# Patient Record
(demographics unavailable — no encounter records)

---

## 2024-12-03 NOTE — ASSESSMENT
[FreeTextEntry1] : Patient is 7 months s/p Lingula sparing left upper lobectomy for Stage IA3, pT1cN0, invasive acinar adenocarcinoma.   I reviewed the Surveillance CT chest completed on 11/22/2024 with the patient today: stable post sublobar resection left upper lobe. No consolidation, mass or pleural effusions. Stable approximate 6 mm solid nodule left lower lobe (5/122). No suspicious pulmonary parenchymal lesions. She is doing well with mild phlegm in the morning. No persist cough or shortness of breath.    Will continue lung cancer Surveillance with a CT chest in 6 months.  Patient was advised to contact us should any concerning symptoms arise.   Plan: CT chest in 6 months

## 2024-12-03 NOTE — HISTORY OF PRESENT ILLNESS
[FreeTextEntry1] : ROME MUNROE is a 55-year-old F, never smoker, with no significant PMHX, who is referred by Dr. Tristan Thomas for an initial evaluation of lung nodule.  On 5/3/24, patient underwent left VATS, robotic-assisted, Wedge resection of left upper lobe lung nodule, Lingula-sparing left upper lobectomy, Mediastinal lymph node dissection. Surgical pathology: Stage IA3, pT1cN0, invasive acinar adenocarcinoma, all margins negative. No adjuvant chemotherapy indicated per NCCN guideline.  She presents today to review the first Surveillance scan.  Reports feeling well in general. No unintentional weight loss, headache, anorexia, fatigue, persist cough, chest pain, dyspnea, or hemoptysis.  CT chest on 11/22/2024 MPRESSION: Since  January 14, April 30, 2024. Post sublobar resection left upper lobe. No consolidation, mass or pleural effusions. Stable approximate 6 mm solid nodule left lower lobe (5/122). No suspicious pulmonary parenchymal lesions Approximate 9 mm hypodense lesion, posterior right hepatic lobe, similar to earlier study. New approximate 4 mm hypodensity right hepatic dome (3/118) , too small to fully characterize.

## 2024-12-03 NOTE — DATA REVIEWED
[FreeTextEntry1] : PET/CT on 04/30/2024 1. Mildly FDG avid 3.2 x 2.2 cm groundglass mass at the anterior left upper lobe, SUV max 1.5, probably a low-grade malignancy. No evidence of FDG avid metastatic disease. 2. Probable small right renal cyst. Confirmation with renal ultrasound is recommended.

## 2024-12-03 NOTE — REASON FOR VISIT
[Home] : at home, [unfilled] , at the time of the visit. [Medical Office: ([facility])___] : at the medical office located in  [Verbal consent obtained from patient] : the patient, [unfilled] [Follow-Up: _____] : a [unfilled] follow-up visit

## 2024-12-04 NOTE — ASSESSMENT
[FreeTextEntry1] : Patient is 7 months s/p Lingula sparing left upper lobectomy for Stage IA3, pT1cN0, invasive acinar adenocarcinoma.   I reviewed the Surveillance CT chest completed on 11/22/2024 with the patient today: stable post sublobar resection left upper lobe. No consolidation, mass or pleural effusions. Stable approximate 6 mm solid nodule left lower lobe (5/122). No suspicious pulmonary parenchymal lesions. She is doing well with mild phlegm in the morning. No persist cough or shortness of breath.    Will continue lung cancer Surveillance with a CT chest in 6 months.  Patient was advised to contact us should any concerning symptoms arise.   Plan: CT chest in 6 months Rhombic Flap Text: The defect edges were debeveled with a #15 scalpel blade.  Given the location of the defect and the proximity to free margins a rhombic flap was deemed most appropriate.  Using a sterile surgical marker, an appropriate rhombic flap was drawn incorporating the defect.    The area thus outlined was incised deep to adipose tissue with a #15 scalpel blade.  The skin margins were undermined to an appropriate distance in all directions utilizing iris scissors.

## 2025-05-23 NOTE — REASON FOR VISIT
[Home] : at home, [unfilled] , at the time of the visit. [Medical Office: ([facility])___] : at the medical office located in  [Telephone (audio)] : This telephonic visit was provided via audio only technology. [No access to tele-video equipment] : patient lacks access to tele-video equipment. [Verbal consent obtained from patient] : the patient, [unfilled] [Follow-Up: _____] : a [unfilled] follow-up visit

## 2025-05-23 NOTE — HISTORY OF PRESENT ILLNESS
[FreeTextEntry1] : ROME MUNROE is a 55-year-old F, never smoker, with no significant PMHX, who is referred by Dr. Tristan Thomas for an initial evaluation of lung nodule.  On 5/3/24, patient underwent left VATS, robotic-assisted, Wedge resection of left upper lobe lung nodule, Lingula-sparing left upper lobectomy, Mediastinal lymph node dissection. Surgical pathology: Stage IA3, pT1cN0, invasive acinar adenocarcinoma, all margins negative. No adjuvant chemotherapy indicated per NCCN guideline. She presents today to review the first Surveillance scan. Reports feeling well in general. No unintentional weight loss, headache, anorexia, fatigue, persist cough, chest pain, dyspnea, or hemoptysis.  CT chest on 05/17/2025 - Since 2023, unchanged few benign-appearing subcentimeter solid nodules, largest in left lower lobe (5, 125), 6 mm. Left lower lobe pleuroparenchymal scaring. - Since November 22, 2024, no cancer recurrence and no change.

## 2025-05-23 NOTE — ASSESSMENT
[FreeTextEntry1] : Patient is s/p Lingula sparing left upper lobectomy for Stage IA3, pT1cN0, invasive acinar adenocarcinoma.  I reviewed the Surveillance CT chest completed on 05/17/2025 with the patient today: stable post sublobar resection left upper lobe. No consolidation, mass or pleural effusions. Stable approximate 6 mm solid nodule left lower lobe (5/122). No suspicious pulmonary parenchymal lesions. She is doing well.   Will continue lung cancer Surveillance with a CT chest in 6 months. Patient was advised to contact us should any concerning symptoms arise.  Plan: CT chest in 6 months.